# Patient Record
(demographics unavailable — no encounter records)

---

## 2024-10-25 NOTE — HISTORY OF PRESENT ILLNESS
[FreeTextEntry1] : crohn's disease Ileocolic resection following spontaneous perforation while on Humira Subsequent drug failures to the following: Maria Esther Marcos Good disease control achieved with Remicade, followed by drug-related hepatitis Patient transition, continues to do well with Simponi Last colonoscopy October 2022, no gross disease activity Regular bowel habit  Patient is 26 weeks pregnant Had some upper GI complaints early during her first trimester, during a period of time when she was without her medication, simpony Resolved spontaneously.  Patient aware of dietary modifications, Listeria precautions

## 2024-10-25 NOTE — ASSESSMENT
[FreeTextEntry1] : Well-controlled Crohn's 26-week of pregnancy Plans to continue Simponi on schedule throughout pregnancy No need for any blood testing at this time.  Return to the office again in 4 months following delivery

## 2025-03-07 NOTE — ASSESSMENT
[FreeTextEntry1] : Well-controlled Crohn's Delivery 1 month ago, patient and baby doing well Labs done yesterday Continue Simponi q2wk Plan for colonoscopy later this year Discussed non-urgent dermatology f/u for FBSE  RTC 6 mo

## 2025-03-07 NOTE — ADDENDUM
[FreeTextEntry1] : attending addendum  post delivery one month daughter (2.5 yr old son) on leave from work continues to feel well with simponi med renewed ov 6 months prior to surveillance colonoscopy

## 2025-03-07 NOTE — HISTORY OF PRESENT ILLNESS
[FreeTextEntry1] : 33F with ileocolonic Crohn disease (s/p ileocolic resection) here for follow-up.  Ileocolic resection following spontaneous perforation while on Humira Subsequent drug failures to the following: Maria Esther Marcos Good disease control achieved with Remicade, followed by drug-related hepatitis Patient transition, continues to do well with Simponi  Delivered baby girl Topher in February Has been doing well, regular bowel habit Currently on Simponi q2wk Had some low iron levels during pregnancy, labs done with hematologist yesterday but have not resulted yet Family doing well as well  Last colonoscopy October 2022, no gross disease activity